# Patient Record
Sex: FEMALE | Race: AMERICAN INDIAN OR ALASKA NATIVE
[De-identification: names, ages, dates, MRNs, and addresses within clinical notes are randomized per-mention and may not be internally consistent; named-entity substitution may affect disease eponyms.]

---
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
## 2022-12-11 ENCOUNTER — HOSPITAL ENCOUNTER (EMERGENCY)
Dept: HOSPITAL 43 - DL.ED | Age: 22
Discharge: HOME | End: 2022-12-11
Payer: MEDICAID

## 2022-12-11 DIAGNOSIS — Z20.822: ICD-10-CM

## 2022-12-11 DIAGNOSIS — R10.13: ICD-10-CM

## 2022-12-11 DIAGNOSIS — O99.891: Primary | ICD-10-CM

## 2022-12-11 DIAGNOSIS — Z3A.01: ICD-10-CM

## 2022-12-11 DIAGNOSIS — O21.9: ICD-10-CM

## 2022-12-11 LAB
ANION GAP SERPL CALC-SCNC: 13.7 MEQ/L (ref 7–13)
CHLORIDE SERPL-SCNC: 103 MMOL/L (ref 98–107)
EGFRCR SERPLBLD CKD-EPI 2021: 127 ML/MIN (ref 60–?)
RSV RNA UPPER RESP QL NAA+PROBE: NEGATIVE
SARS-COV-2 RNA RESP QL NAA+PROBE: NEGATIVE
SODIUM SERPL-SCNC: 137 MMOL/L (ref 136–145)

## 2022-12-11 PROCEDURE — 87430 STREP A AG IA: CPT

## 2022-12-11 PROCEDURE — 82150 ASSAY OF AMYLASE: CPT

## 2022-12-11 PROCEDURE — 85025 COMPLETE CBC W/AUTO DIFF WBC: CPT

## 2022-12-11 PROCEDURE — 87081 CULTURE SCREEN ONLY: CPT

## 2022-12-11 PROCEDURE — 99284 EMERGENCY DEPT VISIT MOD MDM: CPT

## 2022-12-11 PROCEDURE — 96361 HYDRATE IV INFUSION ADD-ON: CPT

## 2022-12-11 PROCEDURE — 36415 COLL VENOUS BLD VENIPUNCTURE: CPT

## 2022-12-11 PROCEDURE — 81001 URINALYSIS AUTO W/SCOPE: CPT

## 2022-12-11 PROCEDURE — 0241U: CPT

## 2022-12-11 PROCEDURE — 83690 ASSAY OF LIPASE: CPT

## 2022-12-11 PROCEDURE — 87086 URINE CULTURE/COLONY COUNT: CPT

## 2022-12-11 PROCEDURE — 96375 TX/PRO/DX INJ NEW DRUG ADDON: CPT

## 2022-12-11 PROCEDURE — 84702 CHORIONIC GONADOTROPIN TEST: CPT

## 2022-12-11 PROCEDURE — 96374 THER/PROPH/DIAG INJ IV PUSH: CPT

## 2022-12-11 PROCEDURE — 80053 COMPREHEN METABOLIC PANEL: CPT

## 2023-03-10 ENCOUNTER — HOSPITAL ENCOUNTER (EMERGENCY)
Dept: HOSPITAL 43 - DL.ED | Age: 23
Discharge: HOME | End: 2023-03-10
Payer: MEDICAID

## 2023-03-10 DIAGNOSIS — Z86.16: ICD-10-CM

## 2023-03-10 DIAGNOSIS — O99.891: ICD-10-CM

## 2023-03-10 DIAGNOSIS — O21.9: Primary | ICD-10-CM

## 2023-03-10 DIAGNOSIS — Z3A.18: ICD-10-CM

## 2023-03-10 DIAGNOSIS — R12: ICD-10-CM

## 2023-03-10 LAB
ANION GAP SERPL CALC-SCNC: 13.5 MEQ/L (ref 7–13)
CHLORIDE SERPL-SCNC: 103 MMOL/L (ref 98–107)
EGFRCR SERPLBLD CKD-EPI 2021: 129 ML/MIN (ref 60–?)
SODIUM SERPL-SCNC: 138 MMOL/L (ref 136–145)

## 2023-03-10 PROCEDURE — 80053 COMPREHEN METABOLIC PANEL: CPT

## 2023-03-10 PROCEDURE — 85025 COMPLETE CBC W/AUTO DIFF WBC: CPT

## 2023-03-10 PROCEDURE — 96374 THER/PROPH/DIAG INJ IV PUSH: CPT

## 2023-03-10 PROCEDURE — 82150 ASSAY OF AMYLASE: CPT

## 2023-03-10 PROCEDURE — 83690 ASSAY OF LIPASE: CPT

## 2023-03-10 PROCEDURE — 81001 URINALYSIS AUTO W/SCOPE: CPT

## 2023-03-10 PROCEDURE — 83735 ASSAY OF MAGNESIUM: CPT

## 2023-03-10 PROCEDURE — 86140 C-REACTIVE PROTEIN: CPT

## 2023-03-10 PROCEDURE — 83605 ASSAY OF LACTIC ACID: CPT

## 2023-03-10 PROCEDURE — 99284 EMERGENCY DEPT VISIT MOD MDM: CPT

## 2023-03-10 PROCEDURE — 36415 COLL VENOUS BLD VENIPUNCTURE: CPT

## 2023-03-10 PROCEDURE — 96361 HYDRATE IV INFUSION ADD-ON: CPT

## 2023-03-10 PROCEDURE — 84443 ASSAY THYROID STIM HORMONE: CPT

## 2023-06-12 ENCOUNTER — HOSPITAL ENCOUNTER (EMERGENCY)
Dept: HOSPITAL 43 - DL.ED | Age: 23
LOS: 1 days | Discharge: HOME | End: 2023-06-13
Payer: MEDICAID

## 2023-06-12 DIAGNOSIS — R19.7: ICD-10-CM

## 2023-06-12 DIAGNOSIS — Z3A.01: ICD-10-CM

## 2023-06-12 DIAGNOSIS — Z86.16: ICD-10-CM

## 2023-06-12 DIAGNOSIS — O21.9: Primary | ICD-10-CM

## 2023-06-12 DIAGNOSIS — O99.891: ICD-10-CM

## 2023-06-12 LAB
ALBUMIN SERPL-MCNC: 2.7 G/DL (ref 3.4–5)
ALBUMIN/GLOB SERPL: 0.68 {RATIO}
ALP SERPL-CCNC: 132 U/L (ref 46–116)
ALT SERPL-CCNC: 13 U/L (ref 14–59)
ANION GAP SERPL CALC-SCNC: 14.6 MEQ/L (ref 7–13)
APPEARANCE UR: CLEAR
AST SERPL-CCNC: 15 U/L (ref 15–37)
BACTERIA URNS QL MICRO: (no result) /HPF
BASOPHILS NFR BLD AUTO: 0.2 % (ref 0–1)
BILIRUB SERPL-MCNC: 0.9 MG/DL (ref 0.2–1)
BILIRUB UR STRIP-MCNC: (no result) MG/DL
BUN SERPL-MCNC: 8 MG/DL (ref 7–18)
BUN/CREAT SERPL: 14.3
CALCIUM SERPL-MCNC: 8.6 MG/DL (ref 8.5–10.1)
CHLORIDE SERPL-SCNC: 103 MMOL/L (ref 98–107)
CO2 SERPL-SCNC: 23 MMOL/L (ref 21–32)
COLOR UR: YELLOW
CREAT CL 24H UR+SERPL-VRATE: 155.83 ML/MIN
CREAT SERPL-MCNC: 0.56 MG/DL (ref 0.55–1.02)
CRP SERPL-MCNC: 0.2 MG/DL (ref 0–0.9)
EGFRCR SERPLBLD CKD-EPI 2021: 134 ML/MIN (ref 60–?)
EOSINOPHIL NFR BLD AUTO: 1.1 % (ref 1–3)
EPI CELLS #/AREA URNS HPF: (no result) /HPF
GLOBULIN SER-MCNC: 4 G/DL
GLUCOSE SERPL-MCNC: 71 MG/DL (ref 70–99)
GLUCOSE UR STRIP-MCNC: NEGATIVE MG/DL
HCT VFR BLD AUTO: 30.1 % (ref 37–47)
HGB BLD-MCNC: 9.5 G/DL (ref 12–16)
KETONES UR STRIP-MCNC: >=160 MG/DL
LYMPHOCYTES NFR BLD AUTO: 6.8 % (ref 20.5–50.1)
MCH RBC QN AUTO: 26.2 PG (ref 27–34)
MCHC RBC AUTO-ENTMCNC: 31.6 G/DL (ref 33–35)
MCHC RBC AUTO-ENTMCNC: 82.9 FL (ref 80–100)
MONOCYTES NFR BLD AUTO: 6.4 % (ref 2–8)
MUCOUS THREADS URNS QL MICRO: (no result) /LPF
NEUTROPHILS NFR BLD AUTO: 85.5 % (ref 42.2–75.2)
NITRITE UR QL: NEGATIVE
PH UR STRIP: 7 [PH] (ref 5–9)
PLATELET # BLD AUTO: 336 10^3/UL (ref 150–450)
POTASSIUM SERPL-SCNC: 3.6 MMOL/L (ref 3.5–5.1)
PROT SERPL-MCNC: 6.7 G/DL (ref 6.4–8.2)
PROT UR STRIP-MCNC: (no result) MG/DL
RBC # BLD AUTO: 3.63 10^6/UL (ref 4.2–5.4)
RBC # URNS HPF: (no result) /HPF (ref 0–5)
RBC UR QL: NEGATIVE
SODIUM SERPL-SCNC: 137 MMOL/L (ref 136–145)
SP GR UR STRIP: 1.02 (ref 1–1.03)
UROBILINOGEN UR STRIP-ACNC: 4 MG/DL (ref 0.2–1)
WBC # BLD AUTO: 12.9 10^3/UL (ref 5–10)
WBC UR QL: (no result) /HPF

## 2023-06-12 PROCEDURE — 81001 URINALYSIS AUTO W/SCOPE: CPT

## 2023-06-12 PROCEDURE — 96375 TX/PRO/DX INJ NEW DRUG ADDON: CPT

## 2023-06-12 PROCEDURE — 85025 COMPLETE CBC W/AUTO DIFF WBC: CPT

## 2023-06-12 PROCEDURE — 99284 EMERGENCY DEPT VISIT MOD MDM: CPT

## 2023-06-12 PROCEDURE — 87086 URINE CULTURE/COLONY COUNT: CPT

## 2023-06-12 PROCEDURE — 96374 THER/PROPH/DIAG INJ IV PUSH: CPT

## 2023-06-12 PROCEDURE — 96361 HYDRATE IV INFUSION ADD-ON: CPT

## 2023-06-12 PROCEDURE — 80053 COMPREHEN METABOLIC PANEL: CPT

## 2023-06-12 PROCEDURE — 86140 C-REACTIVE PROTEIN: CPT

## 2023-06-12 PROCEDURE — 36415 COLL VENOUS BLD VENIPUNCTURE: CPT

## 2023-06-12 RX ADMIN — SODIUM CHLORIDE ONE MG: 9 INJECTION, SOLUTION INTRAVENOUS at 20:17

## 2023-06-12 RX ADMIN — SODIUM CHLORIDE ONE MG: 9 INJECTION, SOLUTION INTRAVENOUS at 20:15
